# Patient Record
Sex: FEMALE | Race: WHITE | NOT HISPANIC OR LATINO | URBAN - METROPOLITAN AREA
[De-identification: names, ages, dates, MRNs, and addresses within clinical notes are randomized per-mention and may not be internally consistent; named-entity substitution may affect disease eponyms.]

---

## 2018-03-01 ENCOUNTER — HOSPITAL ENCOUNTER (OUTPATIENT)
Dept: OUTPATIENT SERVICES | Facility: HOSPITAL | Age: 43
Discharge: HOME | End: 2018-03-01
Attending: SURGERY | Admitting: SURGERY

## 2018-03-06 ENCOUNTER — HOSPITAL ENCOUNTER (OUTPATIENT)
Dept: PHYSICAL THERAPY | Facility: CLINIC | Age: 43
Setting detail: THERAPIES SERIES
Discharge: HOME | End: 2018-03-06
Attending: FAMILY MEDICINE
Payer: COMMERCIAL

## 2018-03-06 DIAGNOSIS — M25.60 DECREASED RANGE OF MOTION: Primary | ICD-10-CM

## 2018-03-06 PROCEDURE — 97140 MANUAL THERAPY 1/> REGIONS: CPT | Mod: GP

## 2018-03-06 NOTE — PROGRESS NOTES
Patient: Pamella Weber  Location: [facility] - Physical Therapy    MRN: [id number]  Today's date: 3/6/2018       Pt denies pain             Treatment Summary - 03/06/18 0700        Session Details    Document Type daily treatment/progress note    Mode of Treatment individual therapy    Patient/Family Observations Pt reports tightness in the arm pit especially after coming back from trip to [place]       Time Calculation    Start Time 0638    Stop Time 0710    Time Calculation (min) 32 min       Range of Motion (ROM)    General Range of Motion --   manual cording mobilization 30 min L axilla       PT Clinical Impression    Plan For Care Reviewed: Physical Therapy PT plan for care discussed with patient    Frequency of Treatment other (see comments)    Estimated Length of Stay --   3 weeks at 2x    Problem List decreased flexibility    Daily Outcome Statement --   Pt with softening of the axilla after mobs improved reaching             Assessment: Pt progressing well with softening to axilla and able to increase reaching with less discomfort.     Plan: continue with cording mobilization to axilla  Education provided this session. See the Patient Education summary report for full details.

## 2018-03-12 ENCOUNTER — HOSPITAL ENCOUNTER (OUTPATIENT)
Dept: PHYSICAL THERAPY | Facility: CLINIC | Age: 43
Setting detail: THERAPIES SERIES
Discharge: HOME | End: 2018-03-12
Attending: FAMILY MEDICINE
Payer: COMMERCIAL

## 2018-03-12 DIAGNOSIS — M25.60 DECREASED RANGE OF MOTION: Primary | ICD-10-CM

## 2018-03-12 PROCEDURE — 97140 MANUAL THERAPY 1/> REGIONS: CPT | Mod: GP

## 2018-03-20 ENCOUNTER — HOSPITAL ENCOUNTER (OUTPATIENT)
Dept: PHYSICAL THERAPY | Facility: CLINIC | Age: 43
Setting detail: THERAPIES SERIES
Discharge: HOME | End: 2018-03-20
Attending: FAMILY MEDICINE
Payer: COMMERCIAL

## 2018-03-20 DIAGNOSIS — M25.60 DECREASED RANGE OF MOTION: Primary | ICD-10-CM

## 2018-03-20 PROCEDURE — 97140 MANUAL THERAPY 1/> REGIONS: CPT | Mod: GP

## 2018-03-20 PROCEDURE — 97110 THERAPEUTIC EXERCISES: CPT | Mod: GP

## 2018-03-20 NOTE — ADDENDUM NOTE
Encounter addended by: Ileana Singer, PT on: 3/20/2018  9:30 AM<BR>    Actions taken: Charge Capture section accepted

## 2018-03-20 NOTE — PROGRESS NOTES
Patient: Pamella Weber  [facility] Physical [facility]    MRN: [id number]  Today's date: 3/20/2018                   PT Treatment Summary - 03/20/18 1200        Session Details    Document Type daily treatment    Mode of Treatment individual therapy    Patient/Family Observations --   Pt reports core feeling weak and wanting to get more exercis       Time Calculation    Start Time 1030    Stop Time 1130    Time Calculation (min) 60 min       Range of Motion (ROM)    General Range of Motion --   reverse curls 10x2; heeltaps 10x2;SLR ppt 10x2;    Comment, General Range of Motion --   bridges with ball squ 10x2; remberto leg lifts 4x1; MFR/scap mobs       PT Weekly Outcome Summary    Functional Goal Overall Progress progressing toward functional goals as expected    Weekly Outcome Statement --   Focused on core strengthening  and tactile cues for PPT    Recommendations cont PT 2 weeks                   Education provided this session. See the Patient Education summary report for full details.       Goals     None

## 2018-03-22 ENCOUNTER — HOSPITAL ENCOUNTER (OUTPATIENT)
Dept: PHYSICAL THERAPY | Facility: CLINIC | Age: 43
Setting detail: THERAPIES SERIES
Discharge: HOME | End: 2018-03-22
Attending: FAMILY MEDICINE
Payer: COMMERCIAL

## 2018-03-22 DIAGNOSIS — M25.60 DECREASED RANGE OF MOTION: Primary | ICD-10-CM

## 2018-03-22 PROCEDURE — 97110 THERAPEUTIC EXERCISES: CPT | Mod: GP

## 2018-03-22 PROCEDURE — 97140 MANUAL THERAPY 1/> REGIONS: CPT | Mod: GP

## 2018-03-22 NOTE — PROGRESS NOTES
Patient: Pamella Weber  Location: [facility] [place] [facility] - Physical Therapy    MRN: [id number]  Today's date: 3/22/2018                 PT Treatment Summary - 03/22/18 1100        Session Details    Document Type daily treatment    Mode of Treatment individual therapy    Patient/Family Observations Pt reports feeling no cording and just tightness now       Time Calculation    Start Time 1033    Stop Time 1127    Time Calculation (min) 54 min       Range of Motion (ROM)    General Range of Motion --   cording mobs; MFR L breast; hooklying pec S 30sx4 remberto       PT Weekly Outcome Summary    Functional Goal Overall Progress progressing toward functional goals as expected    Weekly Outcome Statement Pt session focused on stretching at endrange.  Pt with pec tightness    Impairments Continuing to Limit Function decreased flexibility    Recommendations d/c in 2 weeks next visit                   Education provided this session. See the Patient Education summary report for full details.       Goals     None

## 2018-03-26 PROBLEM — C50.919 BREAST CANCER (CMS/HCC): Status: ACTIVE | Noted: 2018-03-26

## 2018-04-13 ENCOUNTER — OFFICE VISIT (OUTPATIENT)
Dept: SURGERY | Facility: CLINIC | Age: 43
End: 2018-04-13
Attending: SURGERY
Payer: COMMERCIAL

## 2018-04-13 VITALS
HEART RATE: 80 BPM | BODY MASS INDEX: 25.71 KG/M2 | WEIGHT: 160 LBS | HEIGHT: 66 IN | SYSTOLIC BLOOD PRESSURE: 122 MMHG | DIASTOLIC BLOOD PRESSURE: 88 MMHG | TEMPERATURE: 98.4 F

## 2018-04-13 DIAGNOSIS — Z90.13 S/P BILATERAL MASTECTOMY: Primary | ICD-10-CM

## 2018-04-13 PROBLEM — C50.919 BREAST CANCER (CMS/HCC): Status: RESOLVED | Noted: 2018-03-26 | Resolved: 2018-04-13

## 2018-04-13 PROBLEM — Z85.3 PERSONAL HISTORY OF MALIGNANT NEOPLASM OF BREAST: Status: ACTIVE | Noted: 2018-04-13

## 2018-04-13 PROCEDURE — 99213 OFFICE O/P EST LOW 20 MIN: CPT | Performed by: SURGERY

## 2018-04-13 RX ORDER — LEVOTHYROXINE SODIUM 50 UG/1
50 TABLET ORAL
COMMUNITY

## 2018-04-13 NOTE — PROGRESS NOTES
Pamella comes back to the office today for follow-up for left breast cancer.  Overall she is doing beautifully and is not on any antineoplastic therapy.  She did follow up with physical therapy and the cording under the left arm has resolved.  She completed radiation therapy without any issues.  She is going to follow-up with plastic surgery regarding some revision of the scars, but has not done this yet.  Her menstrual cycles are back and are fairly regular.    There are no changes in her past medical history, past surgical history, medications or allergies.  There is also no change in her family history.    Physical exam: Well-developed, well-nourished female in no apparent distress.  She is alert and oriented ×3 and her mood and affect are appropriate.  There are no cervical or supraclavicular areas of adenopathy.  She has no thyroid masses.  Her breast examination shows excellent postop symmetry.  The breasts are surgically absent replaced with TAD flaps.  There is no evidence of any local recurrence or axillary adenopathy.  She does have some persistent hyperpigmentation on the left side due to the radiation.    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  She completed her chemotherapy and Herceptin and seems to be doing beautifully.  At this point she will follow-up with Alli Luna in August and I will see her back in the office in February for a clinical examination.

## 2018-04-24 ENCOUNTER — HOSPITAL ENCOUNTER (OUTPATIENT)
Dept: PHYSICAL THERAPY | Facility: CLINIC | Age: 43
Setting detail: THERAPIES SERIES
Discharge: HOME | End: 2018-04-24
Attending: FAMILY MEDICINE
Payer: COMMERCIAL

## 2018-04-24 DIAGNOSIS — M25.60 DECREASED RANGE OF MOTION: Primary | ICD-10-CM

## 2018-04-24 PROCEDURE — 97140 MANUAL THERAPY 1/> REGIONS: CPT | Mod: GP

## 2018-04-24 NOTE — PROGRESS NOTES
Patient: Pamella Weber  [facility]    MRN: [id number]  Today's date: 4/24/2018                  PT Treatment Summary - 04/24/18 1100        Session Details    Document Type discharge evaluation    Mode of Treatment individual therapy    Patient/Family Observations --   Pt reports appt with Dr. Banuelos tomorrow       Time Calculation    Start Time 1100    Stop Time 1118    Time Calculation (min) 18 min       Range of Motion (ROM)    General Range of Motion --   D/c performed All goals met No cording or fibrosis noted       PT Clinical Impression    Plan For Care Reviewed: Physical Therapy PT plan for care discussed with patient    Rehab Potential/Prognosis good, to achieve stated therapy goals    Estimated Length of Stay --   d/c today    Daily Outcome Statement --   Pt with full ROM and 5/5 strength to remberto UE.  Pt w/ no cords        Pt arrived for only d/c.  Pt with no issues and ready for just d/c today.  Pt reports independent with HEP.  Pt has met all previously stated goals for full ROM and no cording or fibrosis.  Rikki Score 93/100.             Education provided this session. See the Patient Education summary report for full details.       Goals     None

## 2018-09-20 ENCOUNTER — HOSPITAL ENCOUNTER (OUTPATIENT)
Dept: RADIATION ONCOLOGY | Facility: HOSPITAL | Age: 43
Setting detail: RADIATION/ONCOLOGY SERIES
Discharge: HOME | End: 2018-09-20
Attending: RADIOLOGY
Payer: COMMERCIAL

## 2018-09-20 VITALS — WEIGHT: 174 LBS | BODY MASS INDEX: 27.97 KG/M2 | HEIGHT: 66 IN

## 2018-09-20 DIAGNOSIS — Z90.13 S/P BILATERAL MASTECTOMY: Primary | ICD-10-CM

## 2018-09-20 NOTE — PROGRESS NOTES
Patient ID:  Pamella Weber is a 43 y.o. female.  1975    Referring Physician:   No referring provider defined for this encounter.    Primary Care Provider:  Charly Shen MD     Cancer Staging Information:  Cancer Staging  No matching staging information was found for the patient.    Problem List:  Patient Active Problem List    Diagnosis Date Noted   • S/P bilateral mastectomy 04/13/2018   • Personal history of malignant neoplasm of breast 04/13/2018       Chief Complaint  Chief Complaint   Patient presents with   • Breast Cancer   • Follow-up       Subjective      History of Present Illness:  The following portions of the patient's history were reviewed and updated as appropriate: allergies, current medications, past family history, past medical history, past social history, past surgical history and problem list.   HPI  Pamella is here for 1 year follow up for her Left IDC GR 3 breast cancer. ER negative, FL negative and HER 2 +.     Pamella received neoadjuvant chemo Taxol/Carbo /Herceptin.     Radiation dates were 8/21/17 to 9/25/17.    Pamella did experience some cording in her axillary area and resolved after physical therapy.     Pamella is leading a very active life, taking care of her 4 children, returning to her athletic activities, as well as an active extended family and social life.  She feels strong.      Her menstrual periods have been regular over this past year although her last period was delayed by 7 days.  She is sexually active but her  has had a vasectomy, so there is not a concern about an unplanned pregnancy.    Review of Systems:  Review of Systems - Oncology     Past Medical/Surgical History  Past Medical History:   Diagnosis Date   • Hypothyroidism    • Malignant neoplasm of unspecified site of left female breast (CMS/HCC) (HCC)      Past Surgical History:   Procedure Laterality Date   • MASTECTOMY Bilateral 06/27/2017        Current Medications  Current Outpatient Prescriptions  "  Medication Sig Dispense Refill   • levothyroxine (SYNTHROID) 50 mcg tablet Take 50 mcg by mouth.       No current facility-administered medications for this encounter.        Allergies  Allergies   Allergen Reactions   • No Known Allergies        Social History  Social History     Social History   • Marital status:      Spouse name: madai   • Number of children: 4   • Years of education: N/A     Occupational History   •       Social History Main Topics   • Smoking status: Never Smoker   • Smokeless tobacco: Never Used   • Alcohol use Yes      Comment: social   • Drug use: No   • Sexual activity: Yes     Other Topics Concern   • None     Social History Narrative   • None       Family History  Family History   Problem Relation Age of Onset   • Breast cancer Mother       Family Status   Relation Status   • Mother (Not Specified)               Objective      Physical Exam:  Vital Signs for this encounter:  Height: 167.6 cm (5' 6\")  Weight: 78.9 kg (174 lb) (09/20 1042)    Physical Exam  Performance Status:   ECOG 0    Pamella looks great.  There is no palpable peripheral adenopathy.  Bilateral mastectomy sites with reconstruction have an excellent cosmesis without signs of recurrence.  There is minimal residual diffuse hyperpigmentation of the left reconstructed breast, where she had radiation.  Her nipple reconstruction is pending.    PAIN ASSESSMENT:  Score  0  Location    Pain Intervention      LABS:  No results found for this or any previous visit (from the past 1008 hour(s)).       Assessment/Plan   In summary Pamella has recovered very nicely from her combined modality therapy.  She will continue to lead an active life, healthy lifestyle, and program of close surveillance.    She will work with Dr. Banuelos for nipple reconstruction.  She will have nipple tattooing about 4-6 months afterwards.  I will see her in follow-up in 1 year, or sooner, as needed     "

## 2019-02-13 ENCOUNTER — OFFICE VISIT (OUTPATIENT)
Dept: SURGERY | Facility: CLINIC | Age: 44
End: 2019-02-13
Payer: COMMERCIAL

## 2019-02-13 VITALS
SYSTOLIC BLOOD PRESSURE: 96 MMHG | HEART RATE: 71 BPM | TEMPERATURE: 97.5 F | HEIGHT: 66 IN | BODY MASS INDEX: 25.71 KG/M2 | DIASTOLIC BLOOD PRESSURE: 62 MMHG | WEIGHT: 160 LBS

## 2019-02-13 DIAGNOSIS — Z80.3 FAMILY HISTORY OF MALIGNANT NEOPLASM OF BREAST: ICD-10-CM

## 2019-02-13 DIAGNOSIS — R21 SKIN RASH: ICD-10-CM

## 2019-02-13 DIAGNOSIS — Z90.13 S/P BILATERAL MASTECTOMY: ICD-10-CM

## 2019-02-13 DIAGNOSIS — Z85.3 PERSONAL HISTORY OF MALIGNANT NEOPLASM OF BREAST: ICD-10-CM

## 2019-02-13 DIAGNOSIS — Z13.71 BRCA NEGATIVE: ICD-10-CM

## 2019-02-13 DIAGNOSIS — L91.0 KELOID: Primary | ICD-10-CM

## 2019-02-13 PROCEDURE — 99212 OFFICE O/P EST SF 10 MIN: CPT | Performed by: SURGERY

## 2019-02-13 RX ORDER — HYDROCORTISONE 1 %
CREAM (GRAM) TOPICAL
Qty: 30 G | Refills: 0 | Status: SHIPPED | OUTPATIENT
Start: 2019-02-13 | End: 2020-02-19

## 2019-02-13 NOTE — ASSESSMENT & PLAN NOTE
She has maculopapular rash on the right breast which is her contralateral breast from her cancer that seem to have started with Dermabond at the time of her revision of her scars in November.  We will try hydrocortisone cream twice daily for 2 weeks to see if we can get this to resolve.

## 2019-02-13 NOTE — PATIENT INSTRUCTIONS
pply hydrocortisone cream twice daily to right breast rash for two weeks. Call me at the end of treatment to let me know if rash healed.

## 2019-02-13 NOTE — ASSESSMENT & PLAN NOTE
She is now 22 months status post bilateral mastectomies for left y PT 1 microinvasive N0 M0 carcinoma that was ER/[place] negative and HER-2/yogi positive.  Presently she has no evidence of any recurrent disease.  She is not on any antineoplastic therapy and from the standpoint of breast cancer monitoring, we will simply see her back in the office in 1 year.

## 2019-02-13 NOTE — ASSESSMENT & PLAN NOTE
Was recently diagnosed with a HER-2/yogi positive breast cancer on a screening MRI at the age of 74.  She is going to follow-up with genetic testing as well.

## 2019-02-13 NOTE — ASSESSMENT & PLAN NOTE
She has persistent keloid in the circumareolar incision on the right.  Once the rash is resolved, we will inject this with Kenalog and consider re-excising this and closing over nylon suture in the spring.

## 2019-02-13 NOTE — Clinical Note
Please add to chart that patients Mom was recently diagnosed with breast cancer at 72.  Can you add her genetics to her chart and make sure she doesn't qualify for additional testing

## 2019-02-13 NOTE — ASSESSMENT & PLAN NOTE
She had negative genetic testing which we believe is panel testing at the time of diagnosis.  We will have genetics copy of this in her chart and contact her if there are any updates available given her mother's recent diagnosis.

## 2019-02-13 NOTE — PROGRESS NOTES
Pamella comes the office for follow-up given a history of left breast carcinoma.  She status post bilateral mastectomies.  The most significant change is the fact that her mother was recently diagnosed with a carcinoma that was HER-2/yogi positive on a screening MRI.  Given Pamella's negative genetic testing, she is unlikely to proceed with any further genetic testing.  She reassures me that she is handling this well.  Her other significant event is that in November she had an attempt at revision of the scars along the right nipple and areola complex.  Evidently she had an intense reaction to Dermabond afterwards and has had a persistent rash on the right breast since.  The keloids have also recurred.        There are no changes in her past medical history, past surgical history other than above, medications or allergies.    Family history is as noted above.    ROS:   Significant for no abnormalities other than the right breast rash.    Physical exam: Well-developed, well-nourished female in no apparent distress.  She is alert and oriented ×3 and her mood and affect are appropriate.  Her HEENT has no cervical or supraclavicular adenopathy.  There are no thyroid masses.  Her breast examination is symmetric.  Breasts are surgically absent replaced with TAD flaps.  There are no dominant masses, skin changes,or axillary adenopathy in the left.  On the right, she does have keloid now around three quarters of the area of the nipple and areola complex.  There is also a dry scaly somewhat pruritic rash extending across the new nipple and over approximately a 7 x 9 cm area portion of the breast.  This is not concerning for recurrence.  There is no axillary adenopathy.  Her extremities have no lymphedema..        Assessment/Plan :  Problem List Items Addressed This Visit     S/P bilateral mastectomy    Personal history of malignant neoplasm of breast     She is now 22 months status post bilateral mastectomies for left y PT 1  microinvasive N0 M0 carcinoma that was ER/[place] negative and HER-2/yogi positive.  Presently she has no evidence of any recurrent disease.  She is not on any antineoplastic therapy and from the standpoint of breast cancer monitoring, we will simply see her back in the office in 1 year.         BRCA negative     She had negative genetic testing which we believe is panel testing at the time of diagnosis.  We will have genetics copy of this in her chart and contact her if there are any updates available given her mother's recent diagnosis.         Family history of malignant neoplasm of breast     Was recently diagnosed with a HER-2/yogi positive breast cancer on a screening MRI at the age of 74.  She is going to follow-up with genetic testing as well.         Skin rash     She has maculopapular rash on the right breast which is her contralateral breast from her cancer that seem to have started with Dermabond at the time of her revision of her scars in November.  We will try hydrocortisone cream twice daily for 2 weeks to see if we can get this to resolve.         Relevant Medications    hydrocortisone 1 % cream    Keloid - Primary     She has persistent keloid in the circumareolar incision on the right.  Once the rash is resolved, we will inject this with Kenalog and consider re-excising this and closing over nylon suture in the spring.         Relevant Medications    hydrocortisone 1 % cream        She will call me in 2 weeks to tell me if her rash has dissipated.     Return in about 1 year (around 2/13/2020).    MD Barb Steele MD

## 2020-02-17 ENCOUNTER — TELEPHONE (OUTPATIENT)
Dept: SURGERY | Facility: CLINIC | Age: 45
End: 2020-02-17

## 2020-02-19 ENCOUNTER — OFFICE VISIT (OUTPATIENT)
Dept: SURGERY | Facility: CLINIC | Age: 45
End: 2020-02-19
Payer: COMMERCIAL

## 2020-02-19 VITALS
DIASTOLIC BLOOD PRESSURE: 68 MMHG | WEIGHT: 160 LBS | HEART RATE: 59 BPM | BODY MASS INDEX: 25.11 KG/M2 | SYSTOLIC BLOOD PRESSURE: 107 MMHG | HEIGHT: 67 IN

## 2020-02-19 DIAGNOSIS — Z85.3 PERSONAL HISTORY OF MALIGNANT NEOPLASM OF BREAST: Primary | ICD-10-CM

## 2020-02-19 DIAGNOSIS — Z80.3 FAMILY HISTORY OF MALIGNANT NEOPLASM OF BREAST: ICD-10-CM

## 2020-02-19 DIAGNOSIS — Z13.71 BRCA NEGATIVE: ICD-10-CM

## 2020-02-19 DIAGNOSIS — N63.0 BREAST MASS: ICD-10-CM

## 2020-02-19 DIAGNOSIS — N63.20 LEFT BREAST MASS: ICD-10-CM

## 2020-02-19 PROBLEM — L91.0 KELOID: Status: RESOLVED | Noted: 2019-02-13 | Resolved: 2020-02-19

## 2020-02-19 PROBLEM — R21 SKIN RASH: Status: RESOLVED | Noted: 2019-02-13 | Resolved: 2020-02-19

## 2020-02-19 PROCEDURE — 10021 FNA BX W/O IMG GDN 1ST LES: CPT | Performed by: SURGERY

## 2020-02-19 PROCEDURE — 88173 CYTOPATH EVAL FNA REPORT: CPT | Performed by: SURGERY

## 2020-02-19 PROCEDURE — 99213 OFFICE O/P EST LOW 20 MIN: CPT | Mod: 25 | Performed by: SURGERY

## 2020-02-19 PROCEDURE — 76642 ULTRASOUND BREAST LIMITED: CPT | Performed by: SURGERY

## 2020-02-19 NOTE — PROGRESS NOTES
Pamella comes to the office today for follow-up given a personal history left breast cancer.  She is doing well and really has no complaints.  Her cycles are somewhat irregular although she is clearly not postmenopausal yet.  She is currently not on any antineoplastic therapy.     There are no changes in her past medical history, past surgical history, medications or allergies.    There are no changes in her family history.    ROS:   Significant for no abnormalities in all systems reviewed.    Physical exam: Well-developed, well-nourished female in no apparent distress.  She is alert and oriented ×3 and her mood and affect are appropriate.  Her HEENT has no cervical or supraclavicular adenopathy.  There are no thyroid masses.  Her breast examination is symmetric.  Breasts are surgically absent replaced with D IEP flaps.  On the right, there is no evidence of any new masses or adenopathy.  In the left breast, in the 12 o'clock position approximately 8 cm from the nipple there is a mobile, fibrous mass measuring almost 2 cm.  There is no overlying skin changes.  This is an area most consistent with fat necrosis.  There are no other palpable masses or axillary adenopathy.  Her extremities have full range of motion without lymphedema.      Given the findings on physical examination, we did do an ultrasound in the office.  In the area of concern, there was a hypoechoic mass that was wider than it was tall.  Today this measures 3.1 x 0.9 x 2.1 cm.  This is most consistent with an area of fat necrosis and she does remember having injections in this area.  We did proceed with a fine-needle aspiration under direct palpation.  The skin superolaterally was prepped with alcohol and infiltrated with 1% lidocaine with epinephrine.  A 20-gauge needle was advanced through the area and specimen sent off for cytology.      Assessment/Plan :  Problem List Items Addressed This Visit        Other    Personal history of malignant neoplasm  of breast - Primary     She is now 34 months status post bilateral mastectomies for a left ypT1 microinvasive N0 M0 carcinoma that was ER/ID negative and HER-2/yogi positive.  She has no evidence of any recurrent disease and at this point we will simply see her back in the office in 1 year.         BRCA negative    Family history of malignant neoplasm of breast    Left breast mass     I do suspect this is simply fat necrosis.  We will call her in 48 hours to review the results of the biopsy.  If this is all consistent with fat necrosis I did simply see her back in the office in 1 year for reevaluation.           Other Visit Diagnoses     Breast mass        Relevant Orders    Cytology, Fine Needle Aspiration             Return in about 1 year (around 2/19/2021).    Barb Lomas MD

## 2020-02-19 NOTE — ASSESSMENT & PLAN NOTE
She is now 34 months status post bilateral mastectomies for a left ypT1 microinvasive N0 M0 carcinoma that was ER/[place] negative and HER-2/yogi positive.  She has no evidence of any recurrent disease and at this point we will simply see her back in the office in 1 year.

## 2020-02-19 NOTE — ASSESSMENT & PLAN NOTE
I do suspect this is simply fat necrosis.  We will call her in 48 hours to review the results of the biopsy.  If this is all consistent with fat necrosis I did simply see her back in the office in 1 year for reevaluation.

## 2020-02-20 LAB
CASE RPRT: NORMAL
PATH REPORT.FINAL DX SPEC: NORMAL
PATH REPORT.GROSS SPEC: NORMAL

## 2021-01-26 ENCOUNTER — HOSPITAL ENCOUNTER (OUTPATIENT)
Dept: RADIATION ONCOLOGY | Facility: HOSPITAL | Age: 46
Setting detail: RADIATION/ONCOLOGY SERIES
Discharge: HOME | End: 2021-01-26
Attending: RADIOLOGY
Payer: COMMERCIAL

## 2021-01-26 DIAGNOSIS — Z85.3 PERSONAL HISTORY OF MALIGNANT NEOPLASM OF BREAST: Primary | ICD-10-CM

## 2021-01-28 NOTE — PROGRESS NOTES
Pamella Weber is a 45-year-old woman who is doing well without any evidence of active disease after multimodality therapy in the treatment of her high risk ER/SD negative HER-2 positive left breast cancer.    She continues to do relatively well without any evidence of recurrence.  She works hard to follow a program of close surveillance with regular exercise, weight management, Mediterranean diet, alcohol limitation, no smoking, try to sleep well, and share her life. She is being followed by multiple physicians.  I look forward to our next comprehensive follow-up.    She will reconnect shortly.

## 2021-03-04 ENCOUNTER — OFFICE VISIT (OUTPATIENT)
Dept: SURGERY | Facility: CLINIC | Age: 46
End: 2021-03-04
Payer: COMMERCIAL

## 2021-03-04 VITALS
OXYGEN SATURATION: 98 % | WEIGHT: 165 LBS | RESPIRATION RATE: 16 BRPM | DIASTOLIC BLOOD PRESSURE: 70 MMHG | HEART RATE: 63 BPM | SYSTOLIC BLOOD PRESSURE: 126 MMHG | BODY MASS INDEX: 25.9 KG/M2 | HEIGHT: 67 IN

## 2021-03-04 DIAGNOSIS — Z85.3 PERSONAL HISTORY OF MALIGNANT NEOPLASM OF BREAST: Primary | ICD-10-CM

## 2021-03-04 DIAGNOSIS — Z13.71 BRCA NEGATIVE: ICD-10-CM

## 2021-03-04 PROCEDURE — 99212 OFFICE O/P EST SF 10 MIN: CPT | Performed by: SURGERY

## 2021-03-04 NOTE — ASSESSMENT & PLAN NOTE
She is now 47 months status post bilateral mastectomies for left ypT1 microinvasive N0 M0 carcinoma that was ER/[place] negative and HER-2/yogi positive.  She has no evidence of recurrent disease and at this point we will simply see her back in the office in 1 year.

## 2021-03-04 NOTE — Clinical Note
Can you please scan her genetic testing results into the chart.  Under BRCA negative in the overall section, can you just please put the year of testing as well as the company and amount of testing that was done?

## 2021-03-04 NOTE — PROGRESS NOTES
Pamella comes to the office today for follow-up given a personal history of left breast cancer.  Overall she is doing beautifully and has no complaints.  She is not on any antineoplastic therapy.  The previous area of fat necrosis in the left breast appears to be stable.  She is following up with routine screening and actually has her baseline colonoscopy set up next week.      There are no changes in her past medical history, past surgical history, medications or allergies.    There are no changes in her family history.    ROS:   Significant for no abnormalities in all systems reviewed    Physical exam: Well-developed, well-nourished female in no apparent distress.  She is alert and oriented ×3 and her mood and affect are appropriate.  Her HEENT has no cervical or supraclavicular adenopathy.  There are no thyroid masses.  Her breast examination is show some minimal postoperative asymmetry.  The breasts are surgically absent replaced with D IEP flaps.  There is a stable 2 and half centimeter area of fat necrosis in the left breast at 12:00 8 cm from the nipple which had previously been biopsied and found to be consistent with fat necrosis.  There are no other dominant masses, skin changes or axillary adenopathy.  The nipples are reconstructed and faintly tattooed.  The left breast is approximately 10% smaller than the right due to radiation fibrosis.  Her extremities have full range of motion without any evidence of lymphedema.      Assessment/Plan :  Problem List Items Addressed This Visit        Other    Personal history of malignant neoplasm of breast - Primary     She is now 47 months status post bilateral mastectomies for left ypT1 microinvasive N0 M0 carcinoma that was ER/FL negative and HER-2/yogi positive.  She has no evidence of recurrent disease and at this point we will simply see her back in the office in 1 year.         BRCA negative     She had full panel testing which was negative and is therefore  up-to-date.                  Return in about 1 year (around 3/4/2022).    MD Barb Steele MD

## 2021-04-14 DIAGNOSIS — Z23 ENCOUNTER FOR IMMUNIZATION: ICD-10-CM

## 2022-04-28 ENCOUNTER — OFFICE VISIT (OUTPATIENT)
Dept: SURGERY | Facility: CLINIC | Age: 47
End: 2022-04-28
Payer: COMMERCIAL

## 2022-04-28 VITALS
HEIGHT: 67 IN | OXYGEN SATURATION: 99 % | BODY MASS INDEX: 27.75 KG/M2 | WEIGHT: 176.8 LBS | HEART RATE: 95 BPM | TEMPERATURE: 98 F | DIASTOLIC BLOOD PRESSURE: 80 MMHG | SYSTOLIC BLOOD PRESSURE: 122 MMHG

## 2022-04-28 DIAGNOSIS — Z85.3 PERSONAL HISTORY OF MALIGNANT NEOPLASM OF BREAST: Primary | ICD-10-CM

## 2022-04-28 PROCEDURE — 99212 OFFICE O/P EST SF 10 MIN: CPT | Performed by: SURGERY

## 2022-04-28 PROCEDURE — 3008F BODY MASS INDEX DOCD: CPT | Performed by: SURGERY

## 2022-04-28 NOTE — ASSESSMENT & PLAN NOTE
She is now 16-month status post bilateral mastectomies for a left ypT1 microinvasive M0 and 0 carcinoma that was ER/[place] negative and HER2/yogi positive.  She has no evidence of recurrent disease and is presently not on any antineoplastic therapies.  We will simply see her back in the office in 1 year.   No

## 2022-04-28 NOTE — PROGRESS NOTES
Pamella comes to the office today for follow-up given a personal history left breast cancer.  Overall she is doing beautifully and has no complaints.  She is not on any antineoplastic therapy.  She has not noticed any changes in the left breast fat necrosis at 12:00.      There are no changes in her past medical history, past surgical history, medications or allergies.    There are no changes in her family history.    ROS:   Significant for abnormalities in all systems reviewed    Physical exam: Well-developed, well-nourished female in no apparent distress.  She is alert and oriented ×3 and her mood and affect are appropriate.  Her HEENT has no cervical or supraclavicular adenopathy.  There are no thyroid masses.  Her breast examination shows some postoperative asymmetry.  Both breasts are surgically absent replaced with TAD flaps.  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  In addition in the 12 o'clock position superiorly along the edge of the flap there is an area of fat necrosis measuring approximately 2 cm that is stable.  There are no other areas of concern in terms of axillary adenopathy or chest wall masses.         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  She has no evidence of recurrent disease and is presently not on any antineoplastic therapies.  We will simply see her back in the office in 1 year.                    Return in about 1 year (around 4/28/2023).    Barb Lomas MD

## 2025-05-06 NOTE — PROGRESS NOTES
Patient transported to [facility]  12/15/19 8940 Patient: Pamella Weber  Location: [facility] [place] - Physical Therapy    MRN: [id number]  Today's date: 3/12/2018                    Treatment Summary - 03/12/18 1200        Session Details    Document Type daily treatment/progress note    Mode of Treatment individual therapy    Patient/Family Observations Pt reports no cording in the arm pit just tightness on the side of her arm       Time Calculation    Start Time 1133    Stop Time 1219    Time Calculation (min) 46 min       Range of Motion (ROM)    Comment, General Range of Motion --   cording mobs/PROM/scap mobs/ UT trigger work 35 min       Manual Muscle Testing (MMT)    Comment --   lat S 30sx4 with min demo       PT Weekly Outcome Summary    Functional Goal Overall Progress progressing toward functional goals as expected    Weekly Outcome Statement --   Pt progressing well.  Focusing session on stretching.    Recommendations cont PT 2x a week for 2 weeks                   Education provided this session. See the Patient Education summary report for full details.        Detail Level: Generalized General Sunscreen Counseling: I recommended a broad spectrum sunscreen with a SPF of 30 or higher.  I explained that SPF 30 sunscreens block approximately 97 percent of the sun's harmful rays.  Sunscreens should be applied at least 15 minutes prior to expected sun exposure and then every 2 hours after that as long as sun exposure continues. If swimming or exercising sunscreen should be reapplied every 45 minutes to an hour after getting wet or sweating.  One ounce, or the equivalent of a shot glass full of sunscreen, is adequate to protect the skin not covered by a bathing suit. I also recommended a lip balm with a sunscreen as well. Sun protective clothing can be used in lieu of sunscreen but must be worn the entire time you are exposed to the sun's rays.